# Patient Record
Sex: MALE | Race: WHITE | NOT HISPANIC OR LATINO | Employment: FULL TIME | URBAN - METROPOLITAN AREA
[De-identification: names, ages, dates, MRNs, and addresses within clinical notes are randomized per-mention and may not be internally consistent; named-entity substitution may affect disease eponyms.]

---

## 2020-07-11 ENCOUNTER — APPOINTMENT (OUTPATIENT)
Dept: RADIOLOGY | Facility: MEDICAL CENTER | Age: 35
End: 2020-07-11
Attending: EMERGENCY MEDICINE

## 2020-07-11 ENCOUNTER — HOSPITAL ENCOUNTER (EMERGENCY)
Facility: MEDICAL CENTER | Age: 35
End: 2020-07-11
Attending: EMERGENCY MEDICINE

## 2020-07-11 VITALS
OXYGEN SATURATION: 98 % | HEART RATE: 63 BPM | BODY MASS INDEX: 23.04 KG/M2 | RESPIRATION RATE: 16 BRPM | TEMPERATURE: 98.5 F | SYSTOLIC BLOOD PRESSURE: 101 MMHG | DIASTOLIC BLOOD PRESSURE: 63 MMHG | WEIGHT: 160.94 LBS | HEIGHT: 70 IN

## 2020-07-11 DIAGNOSIS — S43.401A SPRAIN OF RIGHT SHOULDER, UNSPECIFIED SHOULDER SPRAIN TYPE, INITIAL ENCOUNTER: ICD-10-CM

## 2020-07-11 DIAGNOSIS — K64.4 EXTERNAL HEMORRHOIDS WITHOUT COMPLICATION: ICD-10-CM

## 2020-07-11 PROCEDURE — 99283 EMERGENCY DEPT VISIT LOW MDM: CPT

## 2020-07-11 PROCEDURE — 73030 X-RAY EXAM OF SHOULDER: CPT | Mod: RT

## 2020-07-11 RX ORDER — HYDROCORTISONE 25 MG/G
1 CREAM TOPICAL 2 TIMES DAILY
Qty: 30 G | Refills: 0 | Status: SHIPPED | OUTPATIENT
Start: 2020-07-11

## 2020-07-11 RX ORDER — NAPROXEN 500 MG/1
500 TABLET ORAL 2 TIMES DAILY WITH MEALS
Qty: 20 TAB | Refills: 0 | Status: SHIPPED | OUTPATIENT
Start: 2020-07-11

## 2020-07-11 SDOH — HEALTH STABILITY: MENTAL HEALTH: HOW OFTEN DO YOU HAVE A DRINK CONTAINING ALCOHOL?: 2-4 TIMES A MONTH

## 2020-07-11 NOTE — ED TRIAGE NOTES
Chief Complaint   Patient presents with   • Shoulder Pain     right side, denies trauma , burning and then sharp pain   • Hemorrhoids     Pt ambulated to triage with above complaint, no deformity noted, denies trauma.

## 2020-07-11 NOTE — ED PROVIDER NOTES
"ED Provider Note    CHIEF COMPLAINT  Chief Complaint   Patient presents with   • Shoulder Pain     right side, denies trauma , burning and then sharp pain   • Hemorrhoids       HPI  Duke Aguilar is a 34 y.o. male who presents valuation of 2 separate complaints.  First he complains of right shoulder pain is been present for the past 5 days after working on a car, he states that it was initially sore the first couple days, the soreness has progressed and the pain seems to shoot down into his right fingers.  No weakness or numbness.  No neck pain or fever.  Additionally complains of rectal pain related to known hemorrhoids for the past couple of weeks.  States that he has generally had soft stool, has not noticed any blood in his stool.  He has been using wet toilet paper to help decrease his discomfort of the clean up after bowel movements.  He denies having any medical problems.    REVIEW OF SYSTEMS  Negative for fever, rash, chest pain, dyspnea, abdominal pain, back pain. All other systems are negative.     PAST MEDICAL HISTORY       SOCIAL HISTORY  Social History     Tobacco Use   • Smoking status: Current Every Day Smoker     Packs/day: 0.75   • Smokeless tobacco: Never Used   Substance and Sexual Activity   • Alcohol use: Yes     Frequency: 2-4 times a month   • Drug use: Yes     Comment: marijuana   • Sexual activity: Not on file       SURGICAL HISTORY  patient denies any surgical history    CURRENT MEDICATIONS  I personally reviewed the medication list in the charting documentation.     ALLERGIES  No Known Allergies    PHYSICAL EXAM  VITAL SIGNS: /82   Pulse 90   Temp 36.7 °C (98.1 °F) (Temporal)   Resp 14   Ht 1.765 m (5' 9.5\")   Wt 73 kg (160 lb 15 oz)   SpO2 97%   BMI 23.43 kg/m²   Constitutional: Alert in no apparent distress.  HENT: No signs of trauma.   Eyes: Conjunctiva normal, Non-icteric.   Chest: Normal nonlabored respirations  Skin: No erythema, No rash.   Musculoskeletal: " Limited range of motion of the right shoulder secondary to pain.  Tenderness diffusely.  No deformity.  Distally neurovascularly intact with normal radial pulse, normal motor function and sensory function in the radial, ulnar and median nerve distributions.  Rectal: Multiple nonthrombosed tender hemorrhoids without bleeding.  Neurologic: Alert, No focal deficits noted.   Psychiatric: Affect normal, Judgment normal.    DIAGNOSTIC STUDIES / PROCEDURES    RADIOLOGY  DX-SHOULDER 2+ RIGHT   Final Result      No evidence of acute fracture or dislocation.            COURSE & MEDICAL DECISION MAKING  Pertinent Labs & Imaging studies reviewed. (See chart for details)    Encounter Summary: This is a 34 y.o. male with right shoulder pain, some tenderness and limited range of motion on exam, x-rays obtained revealing no acute abnormalities, this is a strain of acute shoulder and will be treated with naproxen.  Additionally he has nonthrombosed sternal hemorrhoids on exam, I recommended he use baby wipes or immediate showering after bowel movements rather than using toilet paper in the meantime will prescribe some Anusol.  Strict return instructions discussed      DISPOSITION: Discharge Home      FINAL IMPRESSION  1. Sprain of right shoulder, unspecified shoulder sprain type, initial encounter    2. External hemorrhoids without complication        This dictation was created using voice recognition software. The accuracy of the dictation is limited to the abilities of the software. I expect there may be some errors of grammar and possibly content. The nursing notes were reviewed and certain aspects of this information were incorporated into this note.    Electronically signed by: Jake Sims M.D., 7/11/2020 1:37 PM